# Patient Record
Sex: FEMALE | URBAN - METROPOLITAN AREA
[De-identification: names, ages, dates, MRNs, and addresses within clinical notes are randomized per-mention and may not be internally consistent; named-entity substitution may affect disease eponyms.]

---

## 2019-10-02 ENCOUNTER — OFFICE VISIT (OUTPATIENT)
Dept: URBAN - METROPOLITAN AREA CLINIC 76 | Facility: CLINIC | Age: 54
End: 2019-10-02
Payer: COMMERCIAL

## 2019-10-02 DIAGNOSIS — H25.813 COMBINED FORMS OF AGE-RELATED CATARACT, BILATERAL: ICD-10-CM

## 2019-10-02 DIAGNOSIS — E11.3313 TYPE 2 DIAB W MODERATE NONPRLF DIAB RTNOP W MACULAR EDEMA, BILATERAL: Primary | ICD-10-CM

## 2019-10-02 PROCEDURE — 2022F DILAT RTA XM EVC RTNOPTHY: CPT | Performed by: OPTOMETRIST

## 2019-10-02 PROCEDURE — 99204 OFFICE O/P NEW MOD 45 MIN: CPT | Performed by: OPTOMETRIST

## 2019-10-02 ASSESSMENT — INTRAOCULAR PRESSURE
OS: 19
OD: 19

## 2019-10-02 ASSESSMENT — KERATOMETRY
OS: 43.88
OD: 43.63

## 2019-10-02 NOTE — IMPRESSION/PLAN
Impression: Combined forms of age-related cataract, bilateral: H25.813. OU. possibly cause by long term steroid use? Plan: Discussed condition. Continue to monitor without treatment at this time. Recommend seeing retina specialist before cataract consult.

## 2019-10-02 NOTE — IMPRESSION/PLAN
Impression: Type 2 diab w moderate nonprlf diab rtnop w macular edema, bilateral: V45.7380. OU. Plan: Discussed diagnosis in detail with patient. No treatment is required at this time. Emphasized blood sugar control. Call if [address] worsens. Will continue to observe condition and or symptoms. Recommend next available Retina Consult.

## 2019-10-11 ENCOUNTER — OFFICE VISIT (OUTPATIENT)
Dept: URBAN - METROPOLITAN AREA CLINIC 76 | Facility: CLINIC | Age: 54
End: 2019-10-11
Payer: COMMERCIAL

## 2019-10-11 DIAGNOSIS — H25.13 AGE-RELATED NUCLEAR CATARACT, BILATERAL: Primary | ICD-10-CM

## 2019-10-11 DIAGNOSIS — E11.3293 TYPE 2 DIAB W MILD NONPRLF DIABETIC RTNOP W/O MACULAR EDEMA, BILATERAL: ICD-10-CM

## 2019-10-11 PROCEDURE — 92134 CPTRZ OPH DX IMG PST SGM RTA: CPT | Performed by: OPHTHALMOLOGY

## 2019-10-11 PROCEDURE — 2022F DILAT RTA XM EVC RTNOPTHY: CPT | Performed by: OPHTHALMOLOGY

## 2019-10-11 PROCEDURE — 99204 OFFICE O/P NEW MOD 45 MIN: CPT | Performed by: OPHTHALMOLOGY

## 2019-10-11 ASSESSMENT — INTRAOCULAR PRESSURE
OS: 22
OD: 20

## 2019-10-11 NOTE — IMPRESSION/PLAN
Impression: Type 2 diab w mild nonprlf diabetic rtnop w/o macular edema, bilateral: F27.2627. OU. Plan: OCT ordered and performed today. Discussed diagnosis with patient. The clinical exam was consistent with Type 2 diabetes. Unable to determine if there is microaneurysms present due to obstructed view of the cataracts, The patient was advised to maintain tight blood sugar control, blood pressure and lipid control. Patient was also advised to keep all appointments with PCP for diabetic evaluation and counseling to avoid the systemic complications of diabetes.

## 2019-10-11 NOTE — IMPRESSION/PLAN
Impression: Age-related nuclear cataract, bilateral: H25.13. OU. Plan: No retinal contraindication to CE IOL. Recommend consult with Dr. Светлана Vasquez at her leisure.

## 2019-11-15 ENCOUNTER — OFFICE VISIT (OUTPATIENT)
Dept: URBAN - METROPOLITAN AREA CLINIC 76 | Facility: CLINIC | Age: 54
End: 2019-11-15
Payer: COMMERCIAL

## 2019-11-15 DIAGNOSIS — H52.4 PRESBYOPIA: ICD-10-CM

## 2019-11-15 PROCEDURE — 2022F DILAT RTA XM EVC RTNOPTHY: CPT | Performed by: OPHTHALMOLOGY

## 2019-11-15 PROCEDURE — 99215 OFFICE O/P EST HI 40 MIN: CPT | Performed by: OPHTHALMOLOGY

## 2019-11-15 ASSESSMENT — KERATOMETRY
OD: 43.75
OS: 44.00

## 2019-11-15 ASSESSMENT — INTRAOCULAR PRESSURE
OS: 20
OD: 19

## 2019-11-15 ASSESSMENT — VISUAL ACUITY
OD: 20/200
OS: 20/200

## 2019-11-15 NOTE — IMPRESSION/PLAN
Impression: Type 2 diab w mild nonprlf diabetic rtnop w/o macular edema, bilateral: D29.9243. OU. Plan: Discussed added risk and importance of BS control. Advised if BS too high day of surgery, pt will have to be rescheduled. Continue care with Dr. Ede Jules.

## 2019-11-15 NOTE — IMPRESSION/PLAN
Impression: Age-related nuclear cataract, bilateral: H25.13. OU. Visually significant. Plan: Cataracts account for the patient's complaints. Discussed all risks, benefits, procedures and recovery. Patient understands changing glasses will not improve vision. Discussed added risk(s) due to diabetic retinopathy and maturity of cataracts. Patient desires to have surgery, recommend CE IOL OU, OS first.   
Discussed iol options, recommend STANDARD IOL. TARGET: NEAR (-2.25). RL2. Discussed with Patient the need for gls distance and near after Cataract Surgery. Patient understands. Atropine, Viscoat, [place]

## 2019-11-18 ENCOUNTER — OFFICE VISIT (OUTPATIENT)
Dept: URBAN - METROPOLITAN AREA CLINIC 76 | Facility: CLINIC | Age: 54
End: 2019-11-18
Payer: COMMERCIAL

## 2019-11-18 PROCEDURE — 76519 ECHO EXAM OF EYE: CPT | Performed by: OPHTHALMOLOGY

## 2019-11-18 RX ORDER — DUREZOL 0.5 MG/ML
0.05 % EMULSION OPHTHALMIC
Qty: 5 | Refills: 1 | Status: INACTIVE
Start: 2019-11-18 | End: 2019-11-18

## 2019-11-18 RX ORDER — OFLOXACIN 3 MG/ML
0.3 % SOLUTION/ DROPS OPHTHALMIC
Qty: 5 | Refills: 1 | Status: INACTIVE
Start: 2019-11-18 | End: 2019-12-03

## 2019-11-18 ASSESSMENT — PACHYMETRY
OS: 3.00
OD: 23.66
OD: 3.08
OS: 23.50

## 2019-11-25 ENCOUNTER — SURGERY (OUTPATIENT)
Dept: URBAN - METROPOLITAN AREA SURGERY 47 | Facility: SURGERY | Age: 54
End: 2019-11-25
Payer: COMMERCIAL

## 2019-11-25 PROCEDURE — 66984 XCAPSL CTRC RMVL W/O ECP: CPT | Performed by: OPHTHALMOLOGY

## 2019-11-26 ENCOUNTER — POST-OPERATIVE VISIT (OUTPATIENT)
Dept: URBAN - METROPOLITAN AREA CLINIC 76 | Facility: CLINIC | Age: 54
End: 2019-11-26
Payer: COMMERCIAL

## 2019-11-26 PROCEDURE — 99024 POSTOP FOLLOW-UP VISIT: CPT | Performed by: OPHTHALMOLOGY

## 2019-11-26 ASSESSMENT — INTRAOCULAR PRESSURE
OS: 17
OD: 21

## 2019-12-03 ENCOUNTER — OFFICE VISIT (OUTPATIENT)
Dept: URBAN - METROPOLITAN AREA CLINIC 76 | Facility: CLINIC | Age: 54
End: 2019-12-03
Payer: COMMERCIAL

## 2019-12-03 DIAGNOSIS — Z96.1 PRESENCE OF INTRAOCULAR LENS: ICD-10-CM

## 2019-12-03 DIAGNOSIS — H25.11 AGE-RELATED NUCLEAR CATARACT, RIGHT EYE: Primary | ICD-10-CM

## 2019-12-03 PROCEDURE — 92012 INTRM OPH EXAM EST PATIENT: CPT | Performed by: OPHTHALMOLOGY

## 2019-12-03 PROCEDURE — 2022F DILAT RTA XM EVC RTNOPTHY: CPT | Performed by: OPHTHALMOLOGY

## 2019-12-03 RX ORDER — OFLOXACIN 3 MG/ML
0.3 % SOLUTION/ DROPS OPHTHALMIC
Qty: 5 | Refills: 1 | Status: INACTIVE
Start: 2019-12-03 | End: 2019-12-17

## 2019-12-03 RX ORDER — PREDNISOLONE ACETATE 10 MG/ML
1 % SUSPENSION/ DROPS OPHTHALMIC
Qty: 5 | Refills: 1 | Status: ACTIVE
Start: 2019-12-03

## 2019-12-03 ASSESSMENT — INTRAOCULAR PRESSURE
OD: 18
OS: 16

## 2019-12-03 ASSESSMENT — VISUAL ACUITY
OD: 20/200
OS: 20/30

## 2019-12-03 NOTE — IMPRESSION/PLAN
Impression: Type 2 diab w mild nonprlf diabetic rtnop w/o macular edema, bilateral: C57.3405. OU. Plan: Discussed added risk and importance of BS control. Advised if BS too high day of surgery, pt will have to be rescheduled. Continue care with Dr. Jensen Pacheco.

## 2019-12-03 NOTE — IMPRESSION/PLAN
Impression: Age-related nuclear cataract, right eye: H25.11. OD. Visually significant Plan: Cataracts account for the patient's complaints. Discussed all risks, benefits, procedures and recovery. Patient understands changing glasses will not improve vision. Discussed added risk(s) due to diabetic retinopathy and maturity of cataracts. Patient desires to have surgery, recommend CE IOL OD. Discussed iol options, recommend STANDARD IOL. TARGET: NEAR (-2.25). RL2. Discussed with Patient the need for gls distance and near after Cataract Surgery. Patient understands. 
Atropine, Viscoat, [place]

## 2019-12-09 ENCOUNTER — SURGERY (OUTPATIENT)
Dept: URBAN - METROPOLITAN AREA SURGERY 47 | Facility: SURGERY | Age: 54
End: 2019-12-09
Payer: COMMERCIAL

## 2019-12-09 PROCEDURE — 66984 XCAPSL CTRC RMVL W/O ECP: CPT | Performed by: OPHTHALMOLOGY

## 2019-12-10 ENCOUNTER — POST-OPERATIVE VISIT (OUTPATIENT)
Dept: URBAN - METROPOLITAN AREA CLINIC 76 | Facility: CLINIC | Age: 54
End: 2019-12-10
Payer: COMMERCIAL

## 2019-12-10 PROCEDURE — 99024 POSTOP FOLLOW-UP VISIT: CPT | Performed by: OPHTHALMOLOGY

## 2019-12-17 ENCOUNTER — POST-OPERATIVE VISIT (OUTPATIENT)
Dept: URBAN - METROPOLITAN AREA CLINIC 76 | Facility: CLINIC | Age: 54
End: 2019-12-17
Payer: COMMERCIAL

## 2019-12-17 DIAGNOSIS — Z09 ENCNTR FOR F/U EXAM AFT TRTMT FOR COND OTH THAN MALIG NEOPLM: Primary | ICD-10-CM

## 2019-12-17 PROCEDURE — 99024 POSTOP FOLLOW-UP VISIT: CPT | Performed by: OPTOMETRIST

## 2019-12-17 ASSESSMENT — INTRAOCULAR PRESSURE
OD: 18
OS: 18

## 2019-12-17 ASSESSMENT — VISUAL ACUITY
OD: 20/30-1
OS: 20/30-1

## 2020-01-16 ENCOUNTER — POST-OPERATIVE VISIT (OUTPATIENT)
Dept: URBAN - METROPOLITAN AREA CLINIC 76 | Facility: CLINIC | Age: 55
End: 2020-01-16
Payer: COMMERCIAL

## 2020-01-16 PROCEDURE — 99024 POSTOP FOLLOW-UP VISIT: CPT | Performed by: OPTOMETRIST

## 2020-01-16 ASSESSMENT — INTRAOCULAR PRESSURE
OS: 20
OD: 18

## 2020-01-16 ASSESSMENT — VISUAL ACUITY
OD: 20/30
OS: 20/30-

## 2022-05-25 ENCOUNTER — OFFICE VISIT (OUTPATIENT)
Dept: URBAN - METROPOLITAN AREA CLINIC 81 | Facility: CLINIC | Age: 57
End: 2022-05-25
Payer: COMMERCIAL

## 2022-05-25 DIAGNOSIS — Z96.1 PRESENCE OF INTRAOCULAR LENS: ICD-10-CM

## 2022-05-25 DIAGNOSIS — H52.4 PRESBYOPIA: ICD-10-CM

## 2022-05-25 DIAGNOSIS — E11.3313 TYPE 2 DIAB W MODERATE NONPRLF DIAB RTNOP W MACULAR EDEMA, BILATERAL: Primary | ICD-10-CM

## 2022-05-25 DIAGNOSIS — H26.493 OTHER SECONDARY CATARACT, BILATERAL: ICD-10-CM

## 2022-05-25 PROCEDURE — 99214 OFFICE O/P EST MOD 30 MIN: CPT | Performed by: OPTOMETRIST

## 2022-05-25 PROCEDURE — 92134 CPTRZ OPH DX IMG PST SGM RTA: CPT | Performed by: OPTOMETRIST

## 2022-05-25 ASSESSMENT — INTRAOCULAR PRESSURE
OS: 24
OD: 22

## 2022-05-25 NOTE — IMPRESSION/PLAN
Impression: Other secondary cataract, bilateral: H26.493. Plan: Discussed diagnosis with patient in detail, trace OU. No treatment is required at this time. Will continue to observe condition and/or symptoms. Patient instructed to call if condition gets worse.

## 2022-05-25 NOTE — IMPRESSION/PLAN
Impression: Type 2 diab w moderate nonprlf diab rtnop w macular edema, bilateral: S74.1421.

-05/25/2022 Ordered and reviewed MAC OCT today, OD edema non-central and OS edema non-central Plan: Diabetes type II: moderate background diabetic retinopathy with macular edema. Patient was instructed on the ocular and systemic benefits of blood sugar control. Continue to monitor blood sugar and continue care with PCP. Continue to monitor for changes.

## 2022-06-24 ENCOUNTER — OFFICE VISIT (OUTPATIENT)
Dept: URBAN - METROPOLITAN AREA CLINIC 73 | Facility: CLINIC | Age: 57
End: 2022-06-24
Payer: COMMERCIAL

## 2022-06-24 DIAGNOSIS — Z96.1 PRESENCE OF INTRAOCULAR LENS: ICD-10-CM

## 2022-06-24 DIAGNOSIS — E11.3313 TYPE 2 DIAB W MODERATE NONPRLF DIAB RTNOP W MACULAR EDEMA, BILATERAL: Primary | ICD-10-CM

## 2022-06-24 DIAGNOSIS — D31.32 BENIGN NEOPLASM OF LEFT CHOROID: ICD-10-CM

## 2022-06-24 PROCEDURE — 92134 CPTRZ OPH DX IMG PST SGM RTA: CPT | Performed by: OPHTHALMOLOGY

## 2022-06-24 PROCEDURE — 99204 OFFICE O/P NEW MOD 45 MIN: CPT | Performed by: OPHTHALMOLOGY

## 2022-06-24 ASSESSMENT — INTRAOCULAR PRESSURE
OS: 13
OD: 16

## 2022-06-24 NOTE — IMPRESSION/PLAN
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 Plan: The patient has a choroidal nevus. We discussed the small risk of progression into malignant melanoma. We also discussed the importance of monitoring.

## 2022-06-24 NOTE — IMPRESSION/PLAN
Impression: Type 2 diab w moderate nonprlf diab rtnop w macular edema, bilateral: P98.3467. OCT OU = IRF OU   248 Plan: OD: DME not CI
OS: mild CI DME The patient is a type 2 diabetic with nonproliferative diabetic retinopathy (NPDR) and diabetic macular edema that is fortunately not clinically significant OD and borderline OS. BS/BP control emphasized with patient. We reviewed careful observation for progression. We also reviewed potential treatment options in the future including focal laser, intravitreal injections, and research participation. The patient elects to observe for now, with better systemic control 3m OCT OU re-eval Avastin OU

## 2022-10-07 ENCOUNTER — OFFICE VISIT (OUTPATIENT)
Facility: LOCATION | Age: 57
End: 2022-10-07
Payer: COMMERCIAL

## 2022-10-07 DIAGNOSIS — E11.3313 TYPE 2 DIAB W MODERATE NONPRLF DIAB RTNOP W MACULAR EDEMA, BILATERAL: Primary | ICD-10-CM

## 2022-10-07 DIAGNOSIS — Z96.1 PRESENCE OF INTRAOCULAR LENS: ICD-10-CM

## 2022-10-07 DIAGNOSIS — D31.32 BENIGN NEOPLASM OF LEFT CHOROID: ICD-10-CM

## 2022-10-07 PROCEDURE — 92134 CPTRZ OPH DX IMG PST SGM RTA: CPT | Performed by: OPHTHALMOLOGY

## 2022-10-07 PROCEDURE — 99213 OFFICE O/P EST LOW 20 MIN: CPT | Performed by: OPHTHALMOLOGY

## 2022-10-07 ASSESSMENT — INTRAOCULAR PRESSURE
OD: 15
OS: 15

## 2022-10-07 NOTE — IMPRESSION/PLAN
Impression: Type 2 diab w moderate nonprlf diab rtnop w macular edema, bilateral: T74.1334. OCT OU = increased IRF OU  / 301 Plan: A1c 12  --> 8 OD: DME not CI, but appears increased OS: CI DME, a bit worse RBA's d/w patient Rec SN focal OS with Avastin OS
then 1m OCT OU re-eval Avastin OU

2-4 weeks Avastin OS and Focal OS ([facility])

## 2022-11-22 ENCOUNTER — PROCEDURE (OUTPATIENT)
Dept: URBAN - METROPOLITAN AREA CLINIC 13 | Facility: CLINIC | Age: 57
End: 2022-11-22
Payer: COMMERCIAL

## 2022-11-22 DIAGNOSIS — E11.3313 TYPE 2 DIABETES MELLITUS WITH MODERATE NONPROLIFERATIVE DIABETIC RETINOPATHY WITH MACULAR EDEMA, BILATERAL: Primary | ICD-10-CM

## 2022-11-22 PROCEDURE — 67210 TREATMENT OF RETINAL LESION: CPT | Performed by: OPHTHALMOLOGY

## 2022-11-22 PROCEDURE — 67028 INJECTION EYE DRUG: CPT | Performed by: OPHTHALMOLOGY

## 2022-11-22 ASSESSMENT — INTRAOCULAR PRESSURE
OS: 15
OD: 14

## 2022-12-30 ENCOUNTER — OFFICE VISIT (OUTPATIENT)
Facility: LOCATION | Age: 57
End: 2022-12-30
Payer: COMMERCIAL

## 2022-12-30 DIAGNOSIS — Z96.1 PRESENCE OF INTRAOCULAR LENS: ICD-10-CM

## 2022-12-30 DIAGNOSIS — D31.32 BENIGN NEOPLASM OF LEFT CHOROID: ICD-10-CM

## 2022-12-30 DIAGNOSIS — E11.3313 TYPE 2 DIAB W MODERATE NONPRLF DIAB RTNOP W MACULAR EDEMA, BILATERAL: Primary | ICD-10-CM

## 2022-12-30 PROCEDURE — 99213 OFFICE O/P EST LOW 20 MIN: CPT | Performed by: OPHTHALMOLOGY

## 2022-12-30 PROCEDURE — 92134 CPTRZ OPH DX IMG PST SGM RTA: CPT | Performed by: OPHTHALMOLOGY

## 2022-12-30 PROCEDURE — 67028 INJECTION EYE DRUG: CPT | Performed by: OPHTHALMOLOGY

## 2022-12-30 ASSESSMENT — INTRAOCULAR PRESSURE
OS: 17
OD: 16

## 2022-12-30 NOTE — IMPRESSION/PLAN
Impression: Type 2 diab w moderate nonprlf diab rtnop w macular edema, bilateral: C62.9393. OCT OU = increased IRF OU  / 291
s/p Focal OS - 11/22/2022
s/p Avastin OU - 11/22/2022 Plan: OD: DME not CI, much improved post Avastin = obs
OS: CI DME, much improved post Avastin and Focal but still sig = treat RBA's d/w patient Rec obs OD and treat OS. Discussed R,B,A of Avastin vs Lucentis vs Eylea vs Beovu vs Vabysmo injection. Discussed no FDA approval with Avastin and compounding risk. Discussed signs and symptoms of inflammation, endophthalmitis, vitreous hemorrhage, retinal tear, retinal detachment. Patient understands and wishes to proceed with Avastin injection today. Timeout was performed before procedure. AVASTIN INJECTION COMPLETED TODAY as per protocol without complications. 

1m OCT OU re eval Avastin OU

## 2023-05-31 ENCOUNTER — OFFICE VISIT (OUTPATIENT)
Dept: URBAN - METROPOLITAN AREA CLINIC 76 | Facility: CLINIC | Age: 58
End: 2023-05-31
Payer: COMMERCIAL

## 2023-05-31 DIAGNOSIS — E11.3313 TYPE 2 DIABETES MELLITUS WITH MODERATE NONPROLIFERATIVE DIABETIC RETINOPATHY WITH MACULAR EDEMA, BILATERAL: Primary | ICD-10-CM

## 2023-05-31 DIAGNOSIS — H26.493 OTHER SECONDARY CATARACT, BILATERAL: ICD-10-CM

## 2023-05-31 PROCEDURE — 92134 CPTRZ OPH DX IMG PST SGM RTA: CPT | Performed by: OPTOMETRIST

## 2023-05-31 PROCEDURE — 99214 OFFICE O/P EST MOD 30 MIN: CPT | Performed by: OPTOMETRIST

## 2023-05-31 ASSESSMENT — INTRAOCULAR PRESSURE
OD: 14
OS: 18

## 2023-05-31 ASSESSMENT — KERATOMETRY
OD: 43.75
OS: 43.88

## 2023-05-31 NOTE — IMPRESSION/PLAN
Impression: Type 2 diabetes mellitus with moderate nonproliferative diabetic retinopathy with macular edema, bilateral: Q62.1024. OS>OD
-5/31/23 ordered and reviewed MAC OCT Plan: Diabetes type II: moderate background diabetic retinopathy with diabetic macular edema OU, no signs of neovascularization noted. Will refer patient for a retinal consult to determine if treatment is necessary. Discussed ocular and systemic benefits of maintaining blood sugar control.

## 2023-05-31 NOTE — IMPRESSION/PLAN
Impression: Other secondary cataract, bilateral: H26.493. Plan: Discussed diagnosis with patient in detail. No treatment is required at this time. Will continue to observe condition and/or symptoms. Patient instructed to call if vision changes occur.

## 2023-07-28 ENCOUNTER — OFFICE VISIT (OUTPATIENT)
Dept: URBAN - METROPOLITAN AREA CLINIC 76 | Facility: CLINIC | Age: 58
End: 2023-07-28
Payer: COMMERCIAL

## 2023-07-28 DIAGNOSIS — E11.3313 TYPE 2 DIABETES MELLITUS WITH MODERATE NONPROLIFERATIVE DIABETIC RETINOPATHY WITH MACULAR EDEMA, BILATERAL: Primary | ICD-10-CM

## 2023-07-28 PROCEDURE — 92134 CPTRZ OPH DX IMG PST SGM RTA: CPT | Performed by: OPHTHALMOLOGY

## 2023-07-28 ASSESSMENT — INTRAOCULAR PRESSURE
OD: 20
OS: 23

## 2023-08-25 ENCOUNTER — PROCEDURE (OUTPATIENT)
Dept: URBAN - METROPOLITAN AREA CLINIC 76 | Facility: CLINIC | Age: 58
End: 2023-08-25
Payer: COMMERCIAL

## 2023-08-25 DIAGNOSIS — E11.3313 TYPE 2 DIABETES MELLITUS WITH MODERATE NONPROLIFERATIVE DIABETIC RETINOPATHY WITH MACULAR EDEMA, BILATERAL: Primary | ICD-10-CM

## 2023-08-25 PROCEDURE — 92134 CPTRZ OPH DX IMG PST SGM RTA: CPT | Performed by: OPHTHALMOLOGY

## 2023-08-25 ASSESSMENT — INTRAOCULAR PRESSURE
OS: 16
OD: 16

## 2023-09-08 ENCOUNTER — OFFICE VISIT (OUTPATIENT)
Dept: URBAN - METROPOLITAN AREA CLINIC 76 | Facility: CLINIC | Age: 58
End: 2023-09-08
Payer: COMMERCIAL

## 2023-09-08 DIAGNOSIS — E11.3313 TYPE 2 DIABETES MELLITUS WITH MODERATE NONPROLIFERATIVE DIABETIC RETINOPATHY WITH MACULAR EDEMA, BILATERAL: Primary | ICD-10-CM

## 2023-09-08 DIAGNOSIS — L03.213 PRESEPTAL CELLULITIS: Primary | ICD-10-CM

## 2023-09-08 PROCEDURE — 99213 OFFICE O/P EST LOW 20 MIN: CPT | Performed by: OPTOMETRIST

## 2023-09-08 PROCEDURE — 67210 TREATMENT OF RETINAL LESION: CPT | Performed by: OPHTHALMOLOGY

## 2023-09-08 RX ORDER — AMOXICILLIN AND CLAVULANATE POTASSIUM 500; 125 MG/1; 1/1
TABLET, FILM COATED ORAL
Qty: 30 | Refills: 0 | Status: ACTIVE
Start: 2023-09-08

## 2023-09-08 ASSESSMENT — INTRAOCULAR PRESSURE
OD: 20
OS: 24
OD: 20
OS: 24

## 2023-09-22 ENCOUNTER — OFFICE VISIT (OUTPATIENT)
Dept: URBAN - METROPOLITAN AREA CLINIC 76 | Facility: CLINIC | Age: 58
End: 2023-09-22
Payer: COMMERCIAL

## 2023-09-22 DIAGNOSIS — E11.3313 TYPE 2 DIABETES MELLITUS WITH MODERATE NONPROLIFERATIVE DIABETIC RETINOPATHY WITH MACULAR EDEMA, BILATERAL: Primary | ICD-10-CM

## 2023-09-22 PROCEDURE — 92134 CPTRZ OPH DX IMG PST SGM RTA: CPT | Performed by: OPHTHALMOLOGY

## 2023-09-22 ASSESSMENT — INTRAOCULAR PRESSURE
OD: 15
OS: 18

## 2023-12-15 ENCOUNTER — OFFICE VISIT (OUTPATIENT)
Dept: URBAN - METROPOLITAN AREA CLINIC 76 | Facility: CLINIC | Age: 58
End: 2023-12-15
Payer: COMMERCIAL

## 2023-12-15 PROCEDURE — 92134 CPTRZ OPH DX IMG PST SGM RTA: CPT | Performed by: OPHTHALMOLOGY

## 2023-12-15 ASSESSMENT — INTRAOCULAR PRESSURE
OS: 18
OD: 18

## 2024-01-26 ENCOUNTER — OFFICE VISIT (OUTPATIENT)
Dept: URBAN - METROPOLITAN AREA CLINIC 76 | Facility: CLINIC | Age: 59
End: 2024-01-26
Payer: COMMERCIAL

## 2024-01-26 DIAGNOSIS — E11.3313 TYPE 2 DIABETES MELLITUS WITH MODERATE NONPROLIFERATIVE DIABETIC RETINOPATHY WITH MACULAR EDEMA, BILATERAL: Primary | ICD-10-CM

## 2024-01-26 PROCEDURE — 92134 CPTRZ OPH DX IMG PST SGM RTA: CPT | Performed by: OPHTHALMOLOGY

## 2024-01-26 ASSESSMENT — INTRAOCULAR PRESSURE
OS: 18
OD: 18

## 2024-02-09 ENCOUNTER — OFFICE VISIT (OUTPATIENT)
Dept: URBAN - METROPOLITAN AREA CLINIC 76 | Facility: CLINIC | Age: 59
End: 2024-02-09
Payer: COMMERCIAL

## 2024-02-09 PROCEDURE — 67028 INJECTION EYE DRUG: CPT | Performed by: OPHTHALMOLOGY

## 2024-02-09 ASSESSMENT — INTRAOCULAR PRESSURE
OS: 19
OD: 20

## 2024-03-04 ENCOUNTER — OFFICE VISIT (OUTPATIENT)
Dept: URBAN - METROPOLITAN AREA CLINIC 76 | Facility: CLINIC | Age: 59
End: 2024-03-04
Payer: COMMERCIAL

## 2024-03-04 DIAGNOSIS — E11.3313 TYPE 2 DIABETES MELLITUS WITH MODERATE NONPROLIFERATIVE DIABETIC RETINOPATHY WITH MACULAR EDEMA, BILATERAL: Primary | ICD-10-CM

## 2024-03-04 PROCEDURE — 92134 CPTRZ OPH DX IMG PST SGM RTA: CPT | Performed by: OPHTHALMOLOGY

## 2024-03-04 ASSESSMENT — INTRAOCULAR PRESSURE
OD: 19
OS: 19

## 2024-04-05 ENCOUNTER — OFFICE VISIT (OUTPATIENT)
Dept: URBAN - METROPOLITAN AREA CLINIC 76 | Facility: CLINIC | Age: 59
End: 2024-04-05
Payer: COMMERCIAL

## 2024-04-05 DIAGNOSIS — E11.3313 TYPE 2 DIABETES MELLITUS WITH MODERATE NONPROLIFERATIVE DIABETIC RETINOPATHY WITH MACULAR EDEMA, BILATERAL: Primary | ICD-10-CM

## 2024-04-05 PROCEDURE — 92134 CPTRZ OPH DX IMG PST SGM RTA: CPT | Performed by: OPHTHALMOLOGY

## 2024-04-05 ASSESSMENT — INTRAOCULAR PRESSURE
OD: 22
OS: 21

## 2024-05-03 ENCOUNTER — OFFICE VISIT (OUTPATIENT)
Dept: URBAN - METROPOLITAN AREA CLINIC 76 | Facility: CLINIC | Age: 59
End: 2024-05-03
Payer: COMMERCIAL

## 2024-05-03 DIAGNOSIS — E11.3313 TYPE 2 DIABETES MELLITUS WITH MODERATE NONPROLIFERATIVE DIABETIC RETINOPATHY WITH MACULAR EDEMA, BILATERAL: Primary | ICD-10-CM

## 2024-05-03 PROCEDURE — 92134 CPTRZ OPH DX IMG PST SGM RTA: CPT | Performed by: OPHTHALMOLOGY

## 2024-05-03 ASSESSMENT — INTRAOCULAR PRESSURE
OS: 24
OD: 20

## 2024-05-31 ENCOUNTER — OFFICE VISIT (OUTPATIENT)
Dept: URBAN - METROPOLITAN AREA CLINIC 76 | Facility: CLINIC | Age: 59
End: 2024-05-31
Payer: COMMERCIAL

## 2024-05-31 DIAGNOSIS — E11.3313 TYPE 2 DIABETES MELLITUS WITH MODERATE NONPROLIFERATIVE DIABETIC RETINOPATHY WITH MACULAR EDEMA, BILATERAL: Primary | ICD-10-CM

## 2024-05-31 PROCEDURE — 92134 CPTRZ OPH DX IMG PST SGM RTA: CPT | Performed by: OPHTHALMOLOGY

## 2024-05-31 ASSESSMENT — INTRAOCULAR PRESSURE
OS: 22
OD: 20

## 2024-08-23 ENCOUNTER — OFFICE VISIT (OUTPATIENT)
Dept: URBAN - METROPOLITAN AREA CLINIC 76 | Facility: CLINIC | Age: 59
End: 2024-08-23
Payer: COMMERCIAL

## 2024-08-23 DIAGNOSIS — E11.3313 TYPE 2 DIABETES MELLITUS WITH MODERATE NONPROLIFERATIVE DIABETIC RETINOPATHY WITH MACULAR EDEMA, BILATERAL: Primary | ICD-10-CM

## 2024-08-23 PROCEDURE — 92134 CPTRZ OPH DX IMG PST SGM RTA: CPT | Performed by: OPHTHALMOLOGY

## 2024-12-09 ENCOUNTER — OFFICE VISIT (OUTPATIENT)
Dept: URBAN - METROPOLITAN AREA CLINIC 76 | Facility: CLINIC | Age: 59
End: 2024-12-09
Payer: COMMERCIAL

## 2024-12-09 DIAGNOSIS — E11.3313 TYPE 2 DIABETES MELLITUS WITH MODERATE NONPROLIFERATIVE DIABETIC RETINOPATHY WITH MACULAR EDEMA, BILATERAL: Primary | ICD-10-CM

## 2024-12-09 PROCEDURE — 92134 CPTRZ OPH DX IMG PST SGM RTA: CPT | Performed by: OPHTHALMOLOGY

## 2024-12-09 ASSESSMENT — INTRAOCULAR PRESSURE
OS: 22
OD: 19

## 2025-01-04 ENCOUNTER — OFFICE VISIT (OUTPATIENT)
Dept: URBAN - METROPOLITAN AREA CLINIC 76 | Facility: CLINIC | Age: 60
End: 2025-01-04
Payer: COMMERCIAL

## 2025-01-04 DIAGNOSIS — E11.3313 TYPE 2 DIABETES MELLITUS WITH MODERATE NONPROLIFERATIVE DIABETIC RETINOPATHY WITH MACULAR EDEMA, BILATERAL: Primary | ICD-10-CM

## 2025-01-04 PROCEDURE — 67028 INJECTION EYE DRUG: CPT | Performed by: OPHTHALMOLOGY

## 2025-01-04 ASSESSMENT — INTRAOCULAR PRESSURE
OD: 20
OS: 22

## 2025-01-20 ENCOUNTER — OFFICE VISIT (OUTPATIENT)
Dept: URBAN - METROPOLITAN AREA CLINIC 76 | Facility: CLINIC | Age: 60
End: 2025-01-20
Payer: COMMERCIAL

## 2025-01-20 DIAGNOSIS — E11.3313 TYPE 2 DIABETES MELLITUS WITH MODERATE NONPROLIFERATIVE DIABETIC RETINOPATHY WITH MACULAR EDEMA, BILATERAL: Primary | ICD-10-CM

## 2025-01-20 PROCEDURE — 92134 CPTRZ OPH DX IMG PST SGM RTA: CPT | Performed by: OPHTHALMOLOGY

## 2025-01-20 ASSESSMENT — INTRAOCULAR PRESSURE
OD: 17
OS: 17

## 2025-02-03 ENCOUNTER — OFFICE VISIT (OUTPATIENT)
Dept: URBAN - METROPOLITAN AREA CLINIC 76 | Facility: CLINIC | Age: 60
End: 2025-02-03
Payer: COMMERCIAL

## 2025-02-03 DIAGNOSIS — E11.3313 TYPE 2 DIABETES MELLITUS WITH MODERATE NONPROLIFERATIVE DIABETIC RETINOPATHY WITH MACULAR EDEMA, BILATERAL: Primary | ICD-10-CM

## 2025-02-03 PROCEDURE — 67028 INJECTION EYE DRUG: CPT | Performed by: OPHTHALMOLOGY

## 2025-02-03 ASSESSMENT — INTRAOCULAR PRESSURE
OD: 18
OS: 17

## 2025-03-07 ENCOUNTER — OFFICE VISIT (OUTPATIENT)
Dept: URBAN - METROPOLITAN AREA CLINIC 76 | Facility: CLINIC | Age: 60
End: 2025-03-07
Payer: COMMERCIAL

## 2025-03-07 DIAGNOSIS — E11.3313 TYPE 2 DIABETES MELLITUS WITH MODERATE NONPROLIFERATIVE DIABETIC RETINOPATHY WITH MACULAR EDEMA, BILATERAL: Primary | ICD-10-CM

## 2025-03-07 ASSESSMENT — INTRAOCULAR PRESSURE
OD: 19
OS: 22

## 2025-04-14 ENCOUNTER — OFFICE VISIT (OUTPATIENT)
Dept: URBAN - METROPOLITAN AREA CLINIC 76 | Facility: CLINIC | Age: 60
End: 2025-04-14
Payer: COMMERCIAL

## 2025-04-14 DIAGNOSIS — E11.3313 TYPE 2 DIABETES MELLITUS WITH MODERATE NONPROLIFERATIVE DIABETIC RETINOPATHY WITH MACULAR EDEMA, BILATERAL: Primary | ICD-10-CM

## 2025-04-14 PROCEDURE — 92134 CPTRZ OPH DX IMG PST SGM RTA: CPT | Performed by: OPHTHALMOLOGY

## 2025-04-14 PROCEDURE — 67028 INJECTION EYE DRUG: CPT | Performed by: OPHTHALMOLOGY

## 2025-04-14 ASSESSMENT — INTRAOCULAR PRESSURE
OS: 23
OD: 22

## 2025-05-30 ENCOUNTER — OFFICE VISIT (OUTPATIENT)
Dept: URBAN - METROPOLITAN AREA CLINIC 76 | Facility: CLINIC | Age: 60
End: 2025-05-30
Payer: COMMERCIAL

## 2025-05-30 DIAGNOSIS — E11.3313 TYPE 2 DIABETES MELLITUS WITH MODERATE NONPROLIFERATIVE DIABETIC RETINOPATHY WITH MACULAR EDEMA, BILATERAL: Primary | ICD-10-CM

## 2025-05-30 PROCEDURE — 92134 CPTRZ OPH DX IMG PST SGM RTA: CPT | Performed by: OPHTHALMOLOGY

## 2025-05-30 ASSESSMENT — INTRAOCULAR PRESSURE
OS: 24
OD: 22

## 2025-08-22 ENCOUNTER — OFFICE VISIT (OUTPATIENT)
Dept: URBAN - METROPOLITAN AREA CLINIC 76 | Facility: CLINIC | Age: 60
End: 2025-08-22
Payer: COMMERCIAL

## 2025-08-22 DIAGNOSIS — E11.3313 TYPE 2 DIABETES MELLITUS WITH MODERATE NONPROLIFERATIVE DIABETIC RETINOPATHY WITH MACULAR EDEMA, BILATERAL: Primary | ICD-10-CM

## 2025-08-22 PROCEDURE — 92134 CPTRZ OPH DX IMG PST SGM RTA: CPT | Performed by: OPHTHALMOLOGY

## 2025-08-22 ASSESSMENT — INTRAOCULAR PRESSURE
OD: 24
OS: 24